# Patient Record
Sex: MALE | Race: AMERICAN INDIAN OR ALASKA NATIVE
[De-identification: names, ages, dates, MRNs, and addresses within clinical notes are randomized per-mention and may not be internally consistent; named-entity substitution may affect disease eponyms.]

---

## 2017-01-29 ENCOUNTER — HOSPITAL ENCOUNTER (EMERGENCY)
Dept: HOSPITAL 5 - ED | Age: 22
LOS: 2 days | Discharge: PSYCHIATRIC OR SUBSTANCE USE FACILITY | End: 2017-01-31
Payer: SELF-PAY

## 2017-01-29 DIAGNOSIS — F41.9: ICD-10-CM

## 2017-01-29 DIAGNOSIS — F31.9: ICD-10-CM

## 2017-01-29 DIAGNOSIS — T14.91: ICD-10-CM

## 2017-01-29 DIAGNOSIS — I10: ICD-10-CM

## 2017-01-29 DIAGNOSIS — J45.909: ICD-10-CM

## 2017-01-29 DIAGNOSIS — T50.902A: Primary | ICD-10-CM

## 2017-01-29 LAB
ALBUMIN SERPL-MCNC: 4.4 G/DL (ref 3.9–5)
ALBUMIN/GLOB SERPL: 1.4 %
ALP SERPL-CCNC: 98 UNITS/L (ref 35–129)
ALT SERPL-CCNC: 38 UNITS/L (ref 7–56)
ANION GAP SERPL CALC-SCNC: 17 MMOL/L
BASOPHILS NFR BLD AUTO: 0.3 % (ref 0–1.8)
BILIRUB SERPL-MCNC: 0.2 MG/DL (ref 0.1–1.2)
BUN SERPL-MCNC: 8 MG/DL (ref 9–20)
BUN/CREAT SERPL: 8 %
CALCIUM SERPL-MCNC: 9.1 MG/DL (ref 8.4–10.2)
CHLORIDE SERPL-SCNC: 101.3 MMOL/L (ref 98–107)
CK SERPL-CCNC: 231 UNITS/L (ref 55–170)
CO2 SERPL-SCNC: 25 MMOL/L (ref 22–30)
EOSINOPHIL NFR BLD AUTO: 1.4 % (ref 0–4.3)
GLUCOSE SERPL-MCNC: 110 MG/DL (ref 75–100)
HCT VFR BLD CALC: 40.3 % (ref 35.5–45.6)
HGB BLD-MCNC: 13.4 GM/DL (ref 11.8–15.2)
MCH RBC QN AUTO: 29 PG (ref 28–32)
MCHC RBC AUTO-ENTMCNC: 33 % (ref 32–34)
MCV RBC AUTO: 88 FL (ref 84–94)
PLATELET # BLD: 328 K/MM3 (ref 140–440)
POTASSIUM SERPL-SCNC: 3.4 MMOL/L (ref 3.6–5)
PROT SERPL-MCNC: 7.5 G/DL (ref 6.3–8.2)
RBC # BLD AUTO: 4.56 M/MM3 (ref 3.65–5.03)
SODIUM SERPL-SCNC: 140 MMOL/L (ref 137–145)
WBC # BLD AUTO: 7.3 K/MM3 (ref 4.5–11)

## 2017-01-29 PROCEDURE — 93005 ELECTROCARDIOGRAM TRACING: CPT

## 2017-01-29 PROCEDURE — 84484 ASSAY OF TROPONIN QUANT: CPT

## 2017-01-29 PROCEDURE — G0480 DRUG TEST DEF 1-7 CLASSES: HCPCS

## 2017-01-29 PROCEDURE — 82140 ASSAY OF AMMONIA: CPT

## 2017-01-29 PROCEDURE — 82550 ASSAY OF CK (CPK): CPT

## 2017-01-29 PROCEDURE — 99285 EMERGENCY DEPT VISIT HI MDM: CPT

## 2017-01-29 PROCEDURE — 93010 ELECTROCARDIOGRAM REPORT: CPT

## 2017-01-29 PROCEDURE — 36415 COLL VENOUS BLD VENIPUNCTURE: CPT

## 2017-01-29 PROCEDURE — 80053 COMPREHEN METABOLIC PANEL: CPT

## 2017-01-29 PROCEDURE — 85025 COMPLETE CBC W/AUTO DIFF WBC: CPT

## 2017-01-29 PROCEDURE — 80320 DRUG SCREEN QUANTALCOHOLS: CPT
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
## 2017-01-29 NOTE — ADMIT CRITERIA FORM
Admission Criteria Documentation: 





              DRUG INGESTION OR OVERDOSE





Clinical Indications for Admission to Inpatient Care





                                                                               (

Place 'X' for any and all applicable criteria): 





Admission is indicated for severe toxicity as indicated by ANY ONE of the 

following(1)(2)(3)(4)(5)(6):


[ X]I.    Inpatient admission required rather than observation care (Also use 

Drug Ingestion or Overdose: Observation 


         Care guideline as appropriate) because of ANY ONE of  the following:


         [ ]a)   Altered mental status that is severe or persistent


         [ ]b)   Clinical finding (eg, metabolic acidosis, hypoglycemia, 

bradycardia) that is severe or persistent


         [ ]c)   Toxic drug level that is persistent


         [ ]d)   Psychiatric risk status not acceptable for outpatient 

management


         [ ]e)   Continuous intravenous infusion of anticoagulation, platelet 

inhibitor, vasoactive, or antiarrhythmic medication (15)(16)


         [X ]f)    Other condition, treatment or monitoring requiring inpatient 

admission


[ ]II.    Respiratory abnormalities 


[ ]III.   Specific finding indicating severe and likely prolonged drug toxicity 


[ ]IV.  Hemodynamic instability


[ ]V.   Dangerous arrhythmia


[ ]VI.  Hypertension requiring inpatient treatment











Extended stay beyond goal length of stay may be needed for (4): 


[ ]a)   Neurologic or respiratory compromise 


[ ]b)   Hemodynamic instability


[ ]c)   Persistent toxic drug levels (25) 


[ ]d)   Severe drug toxicities or complications 


[ ]e)   Ongoing antidote treatment (eg, acetaminophen overdose)(5)


[ ]f)    Older patients(65 years or older)








The original [employer] content created by [employer] has been revised.


The portions of the content which have been revised are identified through the 

use of italic text or in bold, and [facility] 


has neither reviewed nor approved the modified material. All other unmodified 

content is copyright  [facility] [employer].





Please see references footnoted in the original [facility] [employer] edition 

2016


Admission Criteria Met: Yes

## 2017-01-30 NOTE — EVENT NOTE
Date: 01/30/17








I am asked by the nurse to evaluate the patient for possible convulsions/seizure

-like activity.  I have evaluated the patient in the past.  He apparently has a 

history of seizure and pseudoseizure as well as other psychiatric issues.  He 

has not taken his Lamictal since being here.  His medications have been 

reconciled by me.  Laboratory studies are reviewed and appreciated.  Lactic 

acid 2.5 nonspecific, typically clinically did not get concerned with a lactic 

acid of less than 4.0.  May be related to prolonged tourniquet time.  No fever, 

no cough, no urinary symptoms, highly doubt serious bacterial infection at this 

time.  We will reinitiate his Lamictal.  Vital signs are reviewed.  Patient is 

still awaiting psychiatric placement.








Walks with a steady gait, has a GCS of 15, NIH score of 0.


 Vital Signs











  01/29/17 01/29/17 01/29/17





  11:49 11:50 11:54


 


Temperature   


 


Pulse Rate 126 H 127 H 


 


Respiratory 17 16 18





Rate   


 


Blood Pressure   


 


Blood Pressure   





[Left]   


 


O2 Sat by Pulse 100 100 100





Oximetry   














  01/29/17 01/29/17 01/29/17





  12:00 12:15 12:30


 


Temperature   


 


Pulse Rate 144 H 120 H 108 H


 


Respiratory 22 17 15





Rate   


 


Blood Pressure 153/88 159/73 149/71


 


Blood Pressure   





[Left]   


 


O2 Sat by Pulse 99 100 99





Oximetry   














  01/29/17 01/29/17 01/29/17





  12:32 12:45 13:00


 


Temperature   


 


Pulse Rate 106 H 104 H 108 H


 


Respiratory 16 14 22





Rate   


 


Blood Pressure 149/71 150/78 168/103


 


Blood Pressure   





[Left]   


 


O2 Sat by Pulse 99 99 99





Oximetry   














  01/29/17 01/29/17 01/29/17





  13:16 13:30 13:40


 


Temperature   


 


Pulse Rate 98 H 102 H 92 H


 


Respiratory 17 19 16





Rate   


 


Blood Pressure 128/68 120/48 120/48


 


Blood Pressure   





[Left]   


 


O2 Sat by Pulse 99 100 97





Oximetry   














  01/29/17 01/29/17 01/29/17





  13:46 14:00 14:16


 


Temperature   


 


Pulse Rate 92 H 90 88


 


Respiratory 16 14 13





Rate   


 


Blood Pressure 120/43 111/42 134/42


 


Blood Pressure   





[Left]   


 


O2 Sat by Pulse 97 98 98





Oximetry   














  01/29/17 01/29/17 01/29/17





  14:30 14:36 14:46


 


Temperature   


 


Pulse Rate 99 H 92 H 97 H


 


Respiratory 21 17 19





Rate   


 


Blood Pressure 156/71 156/71 120/62


 


Blood Pressure   





[Left]   


 


O2 Sat by Pulse 98 99 100





Oximetry   














  01/29/17 01/29/17 01/29/17





  15:00 15:15 15:30


 


Temperature   


 


Pulse Rate 90 86 96 H


 


Respiratory 14 13 17





Rate   


 


Blood Pressure 102/51 91/45 152/83


 


Blood Pressure   





[Left]   


 


O2 Sat by Pulse 97 96 99





Oximetry   














  01/29/17 01/29/17 01/29/17





  15:45 16:00 16:16


 


Temperature   


 


Pulse Rate 94 H 88 99 H


 


Respiratory 18 14 18





Rate   


 


Blood Pressure 140/77 132/70 153/87


 


Blood Pressure   





[Left]   


 


O2 Sat by Pulse 99 99 98





Oximetry   














  01/29/17 01/29/17 01/29/17





  16:30 16:45 17:00


 


Temperature   


 


Pulse Rate 92 H 90 84


 


Respiratory 16 14 14





Rate   


 


Blood Pressure 148/77 150/60 133/58


 


Blood Pressure   





[Left]   


 


O2 Sat by Pulse 98 97 98





Oximetry   














  01/29/17 01/29/17 01/29/17





  17:15 17:30 17:45


 


Temperature   


 


Pulse Rate 95 H 104 H 102 H


 


Respiratory 14 16 21





Rate   


 


Blood Pressure 134/66 142/70 144/118


 


Blood Pressure   





[Left]   


 


O2 Sat by Pulse 99 99 99





Oximetry   














  01/29/17 01/29/17 01/29/17





  18:00 18:16 19:00


 


Temperature   97.0 F L


 


Pulse Rate 115 H 115 H 94 H


 


Respiratory 22 15 20





Rate   


 


Blood Pressure 144/118 175/106 


 


Blood Pressure   145/82





[Left]   


 


O2 Sat by Pulse 98 97 97





Oximetry   














  01/30/17 01/30/17





  08:52 12:39


 


Temperature 98.2 F 


 


Pulse Rate 97 H 101 H


 


Respiratory 16 14





Rate  


 


Blood Pressure  


 


Blood Pressure 138/88 133/81





[Left]  


 


O2 Sat by Pulse 97 99





Oximetry  








 Lab Results











  01/29/17 01/29/17 01/29/17 Range/Units





  11:41 11:41 11:42 


 


WBC  7.3    (4.5-11.0)  K/mm3


 


RBC  4.56    (3.65-5.03)  M/mm3


 


Hgb  13.4    (11.8-15.2)  gm/dl


 


Hct  40.3    (35.5-45.6)  %


 


MCV  88    (84-94)  fl


 


MCH  29    (28-32)  pg


 


MCHC  33    (32-34)  %


 


RDW  13.6    (13.2-15.2)  %


 


Plt Count  328    (140-440)  K/mm3


 


Lymph % (Auto)  17.7    (13.4-35.0)  %


 


Mono % (Auto)  7.0    (0.0-7.3)  %


 


Eos % (Auto)  1.4    (0.0-4.3)  %


 


Baso % (Auto)  0.3    (0.0-1.8)  %


 


Lymph #  1.3    (1.2-5.4)  K/mm3


 


Mono #  0.5    (0.0-0.8)  K/mm3


 


Eos #  0.1    (0.0-0.4)  K/mm3


 


Baso #  0.0    (0.0-0.1)  K/mm3


 


Seg Neutrophils %  73.6 H    (40.0-70.0)  %


 


Seg Neutrophils #  5.4    (1.8-7.7)  K/mm3


 


Sodium   140   (137-145)  mmol/L


 


Potassium   3.4 L   (3.6-5.0)  mmol/L


 


Chloride   101.3   ()  mmol/L


 


Carbon Dioxide   25   (22-30)  mmol/L


 


Anion Gap   17   mmol/L


 


BUN   8 L   (9-20)  mg/dL


 


Creatinine   1.0   (0.8-1.5)  mg/dL


 


Estimated GFR   > 60   ml/min


 


BUN/Creatinine Ratio   8.00   %


 


Glucose   110 H   ()  mg/dL


 


Lactic Acid     (0.7-2.0)  mmol/L


 


Calcium   9.1   (8.4-10.2)  mg/dL


 


Total Bilirubin   0.2   (0.1-1.2)  mg/dL


 


AST   20   (5-40)  units/L


 


ALT   38   (7-56)  units/L


 


Alkaline Phosphatase   98   ()  units/L


 


Total Creatine Kinase    231 H  ()  units/L


 


Troponin T    < 0.010  (0.00-0.029)  ng/mL


 


Total Protein   7.5   (6.3-8.2)  g/dL


 


Albumin   4.4   (3.9-5)  g/dL


 


Albumin/Globulin Ratio   1.4   %


 


Salicylates     (2.8-20.0)  mg/dL


 


Acetaminophen     (10.0-30.0)  ug/mL


 


Plasma/Serum Alcohol     (0-0.07)  gm%














  01/29/17 01/29/17 01/29/17 Range/Units





  11:42 11:42 11:42 


 


WBC     (4.5-11.0)  K/mm3


 


RBC     (3.65-5.03)  M/mm3


 


Hgb     (11.8-15.2)  gm/dl


 


Hct     (35.5-45.6)  %


 


MCV     (84-94)  fl


 


MCH     (28-32)  pg


 


MCHC     (32-34)  %


 


RDW     (13.2-15.2)  %


 


Plt Count     (140-440)  K/mm3


 


Lymph % (Auto)     (13.4-35.0)  %


 


Mono % (Auto)     (0.0-7.3)  %


 


Eos % (Auto)     (0.0-4.3)  %


 


Baso % (Auto)     (0.0-1.8)  %


 


Lymph #     (1.2-5.4)  K/mm3


 


Mono #     (0.0-0.8)  K/mm3


 


Eos #     (0.0-0.4)  K/mm3


 


Baso #     (0.0-0.1)  K/mm3


 


Seg Neutrophils %     (40.0-70.0)  %


 


Seg Neutrophils #     (1.8-7.7)  K/mm3


 


Sodium     (137-145)  mmol/L


 


Potassium     (3.6-5.0)  mmol/L


 


Chloride     ()  mmol/L


 


Carbon Dioxide     (22-30)  mmol/L


 


Anion Gap     mmol/L


 


BUN     (9-20)  mg/dL


 


Creatinine     (0.8-1.5)  mg/dL


 


Estimated GFR     ml/min


 


BUN/Creatinine Ratio     %


 


Glucose     ()  mg/dL


 


Lactic Acid     (0.7-2.0)  mmol/L


 


Calcium     (8.4-10.2)  mg/dL


 


Total Bilirubin     (0.1-1.2)  mg/dL


 


AST     (5-40)  units/L


 


ALT     (7-56)  units/L


 


Alkaline Phosphatase     ()  units/L


 


Total Creatine Kinase     ()  units/L


 


Troponin T     (0.00-0.029)  ng/mL


 


Total Protein     (6.3-8.2)  g/dL


 


Albumin     (3.9-5)  g/dL


 


Albumin/Globulin Ratio     %


 


Salicylates  < 0.3 L    (2.8-20.0)  mg/dL


 


Acetaminophen   < 15.0   (10.0-30.0)  ug/mL


 


Plasma/Serum Alcohol    < 0.01  (0-0.07)  gm%














  01/29/17 Range/Units





  11:50 


 


WBC   (4.5-11.0)  K/mm3


 


RBC   (3.65-5.03)  M/mm3


 


Hgb   (11.8-15.2)  gm/dl


 


Hct   (35.5-45.6)  %


 


MCV   (84-94)  fl


 


MCH   (28-32)  pg


 


MCHC   (32-34)  %


 


RDW   (13.2-15.2)  %


 


Plt Count   (140-440)  K/mm3


 


Lymph % (Auto)   (13.4-35.0)  %


 


Mono % (Auto)   (0.0-7.3)  %


 


Eos % (Auto)   (0.0-4.3)  %


 


Baso % (Auto)   (0.0-1.8)  %


 


Lymph #   (1.2-5.4)  K/mm3


 


Mono #   (0.0-0.8)  K/mm3


 


Eos #   (0.0-0.4)  K/mm3


 


Baso #   (0.0-0.1)  K/mm3


 


Seg Neutrophils %   (40.0-70.0)  %


 


Seg Neutrophils #   (1.8-7.7)  K/mm3


 


Sodium   (137-145)  mmol/L


 


Potassium   (3.6-5.0)  mmol/L


 


Chloride   ()  mmol/L


 


Carbon Dioxide   (22-30)  mmol/L


 


Anion Gap   mmol/L


 


BUN   (9-20)  mg/dL


 


Creatinine   (0.8-1.5)  mg/dL


 


Estimated GFR   ml/min


 


BUN/Creatinine Ratio   %


 


Glucose   ()  mg/dL


 


Lactic Acid  2.5 H*  (0.7-2.0)  mmol/L


 


Calcium   (8.4-10.2)  mg/dL


 


Total Bilirubin   (0.1-1.2)  mg/dL


 


AST   (5-40)  units/L


 


ALT   (7-56)  units/L


 


Alkaline Phosphatase   ()  units/L


 


Total Creatine Kinase   ()  units/L


 


Troponin T   (0.00-0.029)  ng/mL


 


Total Protein   (6.3-8.2)  g/dL


 


Albumin   (3.9-5)  g/dL


 


Albumin/Globulin Ratio   %


 


Salicylates   (2.8-20.0)  mg/dL


 


Acetaminophen   (10.0-30.0)  ug/mL


 


Plasma/Serum Alcohol   (0-0.07)  gm%

## 2017-01-31 VITALS — DIASTOLIC BLOOD PRESSURE: 86 MMHG | SYSTOLIC BLOOD PRESSURE: 138 MMHG

## 2017-12-01 ENCOUNTER — HOSPITAL ENCOUNTER (EMERGENCY)
Dept: HOSPITAL 5 - ED | Age: 22
LOS: 1 days | Discharge: HOME | End: 2017-12-02
Payer: COMMERCIAL

## 2017-12-01 DIAGNOSIS — M79.652: ICD-10-CM

## 2017-12-01 DIAGNOSIS — M54.9: Primary | ICD-10-CM
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
## 2017-12-01 DIAGNOSIS — M79.651: ICD-10-CM
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
## 2017-12-01 PROCEDURE — 99284 EMERGENCY DEPT VISIT MOD MDM: CPT

## 2017-12-01 PROCEDURE — 85025 COMPLETE CBC W/AUTO DIFF WBC: CPT

## 2017-12-01 PROCEDURE — 80053 COMPREHEN METABOLIC PANEL: CPT

## 2017-12-01 PROCEDURE — 74177 CT ABD & PELVIS W/CONTRAST: CPT

## 2017-12-01 PROCEDURE — 72128 CT CHEST SPINE W/O DYE: CPT

## 2017-12-01 PROCEDURE — 70450 CT HEAD/BRAIN W/O DYE: CPT

## 2017-12-01 PROCEDURE — 96372 THER/PROPH/DIAG INJ SC/IM: CPT

## 2017-12-01 PROCEDURE — 36415 COLL VENOUS BLD VENIPUNCTURE: CPT

## 2017-12-02 VITALS — SYSTOLIC BLOOD PRESSURE: 124 MMHG | DIASTOLIC BLOOD PRESSURE: 79 MMHG

## 2017-12-02 LAB
ALBUMIN SERPL-MCNC: 4 G/DL (ref 3.9–5)
ALBUMIN/GLOB SERPL: 1.5 %
ALP SERPL-CCNC: 76 UNITS/L (ref 35–129)
ALT SERPL-CCNC: 42 UNITS/L (ref 7–56)
ANION GAP SERPL CALC-SCNC: 18 MMOL/L
BASOPHILS NFR BLD AUTO: 0.1 % (ref 0–1.8)
BILIRUB SERPL-MCNC: 0.2 MG/DL (ref 0.1–1.2)
BUN SERPL-MCNC: 5 MG/DL (ref 9–20)
BUN/CREAT SERPL: 8 %
CALCIUM SERPL-MCNC: 9 MG/DL (ref 8.4–10.2)
CHLORIDE SERPL-SCNC: 100.2 MMOL/L (ref 98–107)
CO2 SERPL-SCNC: 24 MMOL/L (ref 22–30)
EOSINOPHIL NFR BLD AUTO: 1.3 % (ref 0–4.3)
GLUCOSE SERPL-MCNC: 91 MG/DL (ref 75–100)
HCT VFR BLD CALC: 39.9 % (ref 35.5–45.6)
HGB BLD-MCNC: 13.7 GM/DL (ref 11.8–15.2)
MCH RBC QN AUTO: 30 PG (ref 28–32)
MCHC RBC AUTO-ENTMCNC: 34 % (ref 32–34)
MCV RBC AUTO: 87 FL (ref 84–94)
PLATELET # BLD: 372 K/MM3 (ref 140–440)
POTASSIUM SERPL-SCNC: 3.7 MMOL/L (ref 3.6–5)
PROT SERPL-MCNC: 6.7 G/DL (ref 6.3–8.2)
RBC # BLD AUTO: 4.61 M/MM3 (ref 3.65–5.03)
SODIUM SERPL-SCNC: 138 MMOL/L (ref 137–145)
WBC # BLD AUTO: 10.8 K/MM3 (ref 4.5–11)
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
## 2017-12-02 NOTE — CAT SCAN REPORT
FINAL REPORT



PROCEDURE:  CT THORACIC SPINE WO CON



TECHNIQUE:  Computerized axial tomography of the thoracic spine

was performed from C7 - L1 without contrast material. 



HISTORY:  traUMA 



COMPARISON:  No prior studies are available for comparison.



FINDINGS:  

There are no fractures or malalignments. Intervertebral disc

spaces are normal. Facet joints are intact. There is no bony

spinal or foraminal stenosis. Soft tissues are unremarkable.



IMPRESSION:  

There are no fractures or malalignments.

## 2017-12-02 NOTE — CAT SCAN REPORT
FINAL REPORT



PROCEDURE:  CT ABDOMEN PELVIS W CON



TECHNIQUE:  Computerized axial tomography of the abdomen and

pelvis was performed after the IV injection of iodinated nonionic

contrast. 



HISTORY:  traUMA 



COMPARISON:  No prior studies are available for comparison.



FINDINGS:  

Visualized lower thorax: No significant abnormality.



Liver: Normal size and attenuation. There is no liver laceration.





Spleen: Normal size and attenuation. There is no spleen

laceration. 



Gallbladder and biliary system: Normal.



Pancreas: Normal.



Adrenals: Normal.



Kidneys: Normal.



GI tract: There has been an appendectomy. There is no acute bowel

abnormality..



Lymph nodes and mesentery: Normal. 



Vasculature: Normal.



Bladder: Normal.



Reproductive organs: Normal.



Peritoneum: There is no hemoperitoneum, ascites, free air,

abscess or adenopathy..



Musculoskeletal structures: No significant abnormality.



Other: None.  



IMPRESSION:  

There is no acute traumatic injury of the abdomen or pelvis.

## 2019-11-29 ENCOUNTER — HOSPITAL ENCOUNTER (EMERGENCY)
Dept: HOSPITAL 5 - ED | Age: 24
Discharge: HOME | End: 2019-11-29
Payer: COMMERCIAL

## 2019-11-29 VITALS — SYSTOLIC BLOOD PRESSURE: 137 MMHG | DIASTOLIC BLOOD PRESSURE: 87 MMHG

## 2019-11-29 DIAGNOSIS — Z79.899: ICD-10-CM

## 2019-11-29 DIAGNOSIS — I10: ICD-10-CM

## 2019-11-29 DIAGNOSIS — F32.9: ICD-10-CM

## 2019-11-29 DIAGNOSIS — Z88.8: ICD-10-CM

## 2019-11-29 DIAGNOSIS — J45.909: ICD-10-CM

## 2019-11-29 DIAGNOSIS — R56.9: Primary | ICD-10-CM

## 2019-11-29 LAB
BUN SERPL-MCNC: 8 MG/DL (ref 9–20)
BUN/CREAT SERPL: 9 %
CALCIUM SERPL-MCNC: 9.4 MG/DL (ref 8.4–10.2)
HCT VFR BLD CALC: 42 % (ref 35.5–45.6)
HEMOLYSIS INDEX: 47
HGB BLD-MCNC: 14 GM/DL (ref 11.8–15.2)
MCHC RBC AUTO-ENTMCNC: 34 % (ref 32–34)
MCV RBC AUTO: 91 FL (ref 84–94)
PLATELET # BLD: 313 K/MM3 (ref 140–440)
RBC # BLD AUTO: 4.62 M/MM3 (ref 3.65–5.03)

## 2019-11-29 PROCEDURE — 36415 COLL VENOUS BLD VENIPUNCTURE: CPT

## 2019-11-29 PROCEDURE — 80048 BASIC METABOLIC PNL TOTAL CA: CPT

## 2019-11-29 PROCEDURE — 99284 EMERGENCY DEPT VISIT MOD MDM: CPT

## 2019-11-29 PROCEDURE — 85027 COMPLETE CBC AUTOMATED: CPT

## 2019-11-29 PROCEDURE — 82962 GLUCOSE BLOOD TEST: CPT

## 2019-11-29 PROCEDURE — 96365 THER/PROPH/DIAG IV INF INIT: CPT

## 2019-11-29 NOTE — EMERGENCY DEPARTMENT REPORT
ED Seizure HPI





- General


Chief Complaint: Seizure


Stated Complaint: CONVULSIONS


Time Seen by Provider: 11/29/19 15:22


Source: patient, EMS


Mode of arrival: Ambulatory


Limitations: No Limitations





- History of Present Illness


Initial Comments: 





Patient is a 24-year-old Afro-American male with a past medical history of 

seizure disorder who states that he had a seizure today as well as 2 weeks ago. 

Patient states that he is compliant with his Lamictal is not missed any doses.  

He denies fevers chills neck stiffness, cold congestion or nausea vomiting.





- Related Data


                                Home Medications











 Medication  Instructions  Recorded  Confirmed  Last Taken


 


Prazosin [Minipress] 2 mg PO QHS 06/17/16 01/30/17 Unknown


 


QUEtiapine [SEROquel] 125 mg PO QPM 06/17/16 01/29/17 Unknown


 


Venlafaxine HCl [Effexor Xr] 150 mg PO QAM 01/30/17 01/30/17 Unknown


 


lamoTRIgine [LaMICtal] 200 mg PO QAM 01/30/17 01/30/17 Unknown


 


lamoTRIgine [LaMICtal] 250 mg PO QHS 01/30/17 01/30/17 Unknown








                                  Previous Rx's











 Medication  Instructions  Recorded  Last Taken  Type


 


levETIRAcetam [Keppra TAB] 500 mg PO BID #60 tablet 11/29/19 Unknown Rx











                                    Allergies











Allergy/AdvReac Type Severity Reaction Status Date / Time


 


naproxen Allergy  Hives Verified 01/29/17 19:49














ED Review of Systems


ROS: 


Stated complaint: CONVULSIONS


Other details as noted in HPI





Comment: All other systems reviewed and negative





ED Past Medical Hx





- Past Medical History


Previous Medical History?: Yes


Hx Hypertension: Yes


Hx Seizures: Yes


Hx Psychiatric Treatment: Yes (depression panic disorder/ANXIETY/ BIPOLAR)


Hx Asthma: Yes


Hx HIV: No





- Surgical History


Past Surgical History?: No





- Social History


Smoking Status: Never Smoker


Substance Use Type: Alcohol





- Medications


Home Medications: 


                                Home Medications











 Medication  Instructions  Recorded  Confirmed  Last Taken  Type


 


Prazosin [Minipress] 2 mg PO QHS 06/17/16 01/30/17 Unknown History


 


QUEtiapine [SEROquel] 125 mg PO QPM 06/17/16 01/29/17 Unknown History


 


Venlafaxine HCl [Effexor Xr] 150 mg PO QAM 01/30/17 01/30/17 Unknown History


 


lamoTRIgine [LaMICtal] 200 mg PO QAM 01/30/17 01/30/17 Unknown History


 


lamoTRIgine [LaMICtal] 250 mg PO QHS 01/30/17 01/30/17 Unknown History


 


levETIRAcetam [Keppra TAB] 500 mg PO BID #60 tablet 11/29/19  Unknown Rx














ED Physical Exam





- General


Limitations: No Limitations


General appearance: alert, in no apparent distress





- Head


Head exam: Present: atraumatic, normocephalic





- Eye


Eye exam: Present: normal appearance, PERRL, EOMI





- ENT


ENT exam: Present: mucous membranes moist





- Neck


Neck exam: Present: normal inspection





- Respiratory


Respiratory exam: Present: normal lung sounds bilaterally.  Absent: respiratory 

distress, wheezes, rales, rhonchi





- Cardiovascular


Cardiovascular Exam: Present: regular rate, normal rhythm.  Absent: systolic 

murmur, diastolic murmur, rubs, gallop





- GI/Abdominal


GI/Abdominal exam: Present: soft, normal bowel sounds





- Rectal


Rectal exam: Present: deferred





- Extremities Exam


Extremities exam: Present: normal inspection





- Back Exam


Back exam: Present: normal inspection





- Neurological Exam


Neurological exam: Present: alert, oriented X3





- Psychiatric


Psychiatric exam: Present: normal affect, normal mood





- Skin


Skin exam: Present: warm, dry, intact, normal color.  Absent: rash





ED Course





                                   Vital Signs











  11/29/19





  15:12


 


Temperature 99.0 F


 


Pulse Rate 95 H


 


Respiratory 16





Rate 


 


Blood Pressure 137/84


 


O2 Sat by Pulse 98





Oximetry 














ED Medical Decision Making





- Lab Data


Result diagrams: 


                                 11/29/19 15:35





                                 11/29/19 15:35





- Medical Decision Making





Patient states he is compliant with his Lamictal and is having breakthrough 

seizures.  Patient was loaded with Keppra will have this medication added as a 

outpatient medication.  Patient has [employer] insurance and is to follow-up with 

their neurologist.


Critical care attestation.: 


If time is entered above; I have spent that time in minutes in the direct care 

of this critically ill patient, excluding procedure time.








ED Disposition


Clinical Impression: 


 Seizure





Disposition: DC-01 TO HOME OR SELFCARE


Is pt being admited?: No


Does the pt Need Aspirin: No


Condition: Stable


Instructions:  Epilepsy (ED)


Prescriptions: 


levETIRAcetam [Keppra TAB] 500 mg PO BID #60 tablet


Referrals: 


[facility] PISANO [Other] - 3-5 Days (Please follow up with [facility] so that they can

 scheduled to see a neurologist)


Time of Disposition: 17:18

## 2020-10-19 ENCOUNTER — HOSPITAL ENCOUNTER (EMERGENCY)
Dept: HOSPITAL 5 - ED | Age: 25
LOS: 1 days | Discharge: PSYCHIATRIC OR SUBSTANCE USE FACILITY | End: 2020-10-20
Payer: COMMERCIAL

## 2020-10-19 DIAGNOSIS — F41.9: ICD-10-CM

## 2020-10-19 DIAGNOSIS — G40.909: Primary | ICD-10-CM

## 2020-10-19 DIAGNOSIS — I10: ICD-10-CM

## 2020-10-19 DIAGNOSIS — F32.9: ICD-10-CM

## 2020-10-19 DIAGNOSIS — Z79.899: ICD-10-CM

## 2020-10-19 DIAGNOSIS — R45.851: ICD-10-CM

## 2020-10-19 DIAGNOSIS — J45.909: ICD-10-CM

## 2020-10-19 DIAGNOSIS — Z88.8: ICD-10-CM

## 2020-10-19 LAB
ALBUMIN SERPL-MCNC: 4.6 G/DL (ref 3.9–5)
ALT SERPL-CCNC: 30 UNITS/L (ref 7–56)
BASOPHILS # (AUTO): 0 K/MM3 (ref 0–0.1)
BASOPHILS NFR BLD AUTO: 0.4 % (ref 0–1.8)
BUN SERPL-MCNC: 12 MG/DL (ref 9–20)
BUN/CREAT SERPL: 12 %
CALCIUM SERPL-MCNC: 9.5 MG/DL (ref 8.4–10.2)
EOSINOPHIL # BLD AUTO: 0.1 K/MM3 (ref 0–0.4)
EOSINOPHIL NFR BLD AUTO: 0.7 % (ref 0–4.3)
HCT VFR BLD CALC: 43.4 % (ref 35.5–45.6)
HEMOLYSIS INDEX: 12
HGB BLD-MCNC: 14.5 GM/DL (ref 11.8–15.2)
LYMPHOCYTES # BLD AUTO: 2.1 K/MM3 (ref 1.2–5.4)
LYMPHOCYTES NFR BLD AUTO: 18.4 % (ref 13.4–35)
MCHC RBC AUTO-ENTMCNC: 34 % (ref 32–34)
MCV RBC AUTO: 92 FL (ref 84–94)
MONOCYTES # (AUTO): 0.7 K/MM3 (ref 0–0.8)
MONOCYTES % (AUTO): 6.4 % (ref 0–7.3)
PLATELET # BLD: 366 K/MM3 (ref 140–440)
RBC # BLD AUTO: 4.72 M/MM3 (ref 3.65–5.03)

## 2020-10-19 PROCEDURE — 81001 URINALYSIS AUTO W/SCOPE: CPT

## 2020-10-19 PROCEDURE — 80307 DRUG TEST PRSMV CHEM ANLYZR: CPT

## 2020-10-19 PROCEDURE — 85025 COMPLETE CBC W/AUTO DIFF WBC: CPT

## 2020-10-19 PROCEDURE — 80053 COMPREHEN METABOLIC PANEL: CPT

## 2020-10-19 PROCEDURE — 36415 COLL VENOUS BLD VENIPUNCTURE: CPT

## 2020-10-19 PROCEDURE — 80320 DRUG SCREEN QUANTALCOHOLS: CPT

## 2020-10-19 PROCEDURE — G0480 DRUG TEST DEF 1-7 CLASSES: HCPCS

## 2020-10-19 PROCEDURE — 93005 ELECTROCARDIOGRAM TRACING: CPT

## 2020-10-19 PROCEDURE — 83735 ASSAY OF MAGNESIUM: CPT

## 2020-10-19 NOTE — EMERGENCY DEPARTMENT REPORT
HPI





- General


Chief Complaint: Seizure


Time Seen by Provider: 10/19/20 16:59





- HPI


HPI: 





This is a 25-year-old male presents to the emergency department, brought in by 

his mother, for a medical clearance for psychiatric admission, as well as a 

seizure.  The patient admits to daily alcohol use but really was abusing alcohol

over the weekend.  The patient does have a seizure disorder and has been 

noncompliant with his Lamictal 100 mg 3 times daily, over the past 3 to 4 days. 

Patient has a history of depression, anxiety, bipolar disorder and says that he 

has been having suicidal ideations over the past 1 to 2 weeks.  He decided to go

get psychiatric help and was brought over to [place], but the patient 

allegedly had a seizure in the lobby.  For this reason he was brought to our 

emergency department.  At the time of my examination the patient is awake, 

alert, oriented and has the complaints of nausea without vomiting and some body 

aches.  He has a past medical history that includes asthma and hypertension.  He

denies any fever, vision change, slurred speech, numbness or paresthesias.  The 

patient does not have any specific plan as to how he would harm himself.  He 

denies any homicidal ideations or any hallucinations.





ED Past Medical Hx





- Past Medical History


Previous Medical History?: Yes


Hx Hypertension: Yes


Hx Seizures: Yes


Hx Psychiatric Treatment: Yes (depression panic disorder/ANXIETY/ BIPOLAR)


Hx Asthma: Yes


Hx HIV: No





- Social History


Smoking Status: Never Smoker


Substance Use Type: Alcohol





- Medications


Home Medications: 


                                Home Medications











 Medication  Instructions  Recorded  Confirmed  Last Taken  Type


 


Prazosin [Minipress] 2 mg PO QHS 06/17/16 01/30/17 Unknown History


 


QUEtiapine [SEROquel] 125 mg PO QPM 06/17/16 01/29/17 Unknown History


 


Venlafaxine HCl [Effexor Xr] 150 mg PO QAM 01/30/17 01/30/17 Unknown History


 


lamoTRIgine [LaMICtal] 200 mg PO QAM 01/30/17 01/30/17 Unknown History


 


lamoTRIgine [LaMICtal] 250 mg PO QHS 01/30/17 01/30/17 Unknown History


 


levETIRAcetam [Keppra TAB] 500 mg PO BID #60 tablet 11/29/19  Unknown Rx














ED Review of Systems


ROS: 


Stated complaint: SEIZURE


Other details as noted in HPI





Comment: All other systems reviewed and negative


Constitutional: denies: chills, fever


Eyes: denies: eye pain, vision change


ENT: denies: ear pain, throat pain


Respiratory: denies: cough, shortness of breath


Cardiovascular: denies: chest pain, palpitations


Gastrointestinal: nausea.  denies: vomiting


Genitourinary: denies: dysuria, discharge


Musculoskeletal: myalgia.  denies: joint swelling


Skin: denies: rash, lesions


Neurological: denies: weakness, numbness





Physical Exam





- Physical Exam


Vital Signs: 


                                   Vital Signs











  10/19/20





  17:05


 


Temperature 98 F


 


Pulse Rate 95 H


 


Respiratory 18





Rate 


 


Blood Pressure 149/93





[Right] 


 


O2 Sat by Pulse 100





Oximetry 











Physical Exam: 





GENERAL: The patient is well-developed well-nourished.


HENT: Normocephalic.  Atraumatic.    Patient has moist mucous membranes.


EYES: Extraocular motions are intact.  Pupils equal reactive to light 

bilaterally.


NECK: Supple. Trachea is midline.


CHEST/LUNGS: Clear to auscultation.  There is no respiratory distress noted.


HEART/CARDIOVASCULAR: Regular.  There is no tachycardia.  There is no murmur.


ABDOMEN: Abdomen is soft, nontender.  Patient has normal bowel sounds. 


SKIN: Skin is warm and dry. 


NEURO: The patient is awake, alert, and oriented.  The patient is cooperative.  

The patient has no focal neurologic deficits.  Normal speech.  Cranial nerves II

 through XII grossly intact.


MUSCULOSKELETAL: There is no tenderness or deformity.  There is no limitation 

range of motion.  





ED Course


                                   Vital Signs











  10/19/20





  17:05


 


Temperature 98 F


 


Pulse Rate 95 H


 


Respiratory 18





Rate 


 


Blood Pressure 149/93





[Right] 


 


O2 Sat by Pulse 100





Oximetry 














ED Medical Decision Making





- Lab Data


Result diagrams: 


                                 10/19/20 17:09





                                 10/19/20 17:09





- EKG Data


-: EKG Interpreted by Me


EKG shows normal: sinus rhythm, axis, intervals, QRS complexes, ST-T waves


Rate: normal





- EKG Data


When compared to previous EKG there are: previous EKG unavailable


Interpretation: normal EKG





- Medical Decision Making





This patient has been dealing with suicidal ideations over the past 1 to 2 

weeks.  He also admits to some recent alcohol abuse.  The patient went over to 

[facility] with the hopes of being admitted to their facility 

for his depression and suicidal ideation.  However while he was there he 

allegedly had a seizure.  He was then brought to the emergency department by his

 mother for further evaluation.  Since being in the emergency department the 

patient has been awake, alert, oriented and there has been no further seizure-

like activity.  On examination he does not have any focal, motor or sensory 

deficits and his cranial nerves are intact.  He was given a dose of his 

Lamictal.





His vital signs have been reassuring throughout his ED course.  Labs have been 

mostly unremarkable including CBC, metabolic panel, blood alcohol level, urine 

drug screen.  Patient has been made a 1013 secondary to his suicidal ideations 

and will be seen by the psychiatric team in the morning for further disposition.

  If the patient requires inpatient stabilization, he is medically cleared to do

 so.


Critical Care Time: No


Critical care attestation.: 


If time is entered above; I have spent that time in minutes in the direct care 

of this critically ill patient, excluding procedure time.








ED Disposition


Clinical Impression: 


 Seizure disorder, Suicidal ideations





Depression


Qualifiers:


 Depression Type: unspecified Qualified Code(s): F32.9 - Major depressive 

disorder, single episode, unspecified





Disposition: DC/TX-65 PSY HOSP/PSY UNIT


Is pt being admited?: No


Condition: Stable


Time of Disposition: 03:57

## 2020-10-19 NOTE — EVENT NOTE
ED Screening Note


Date of service: 10/19/20


Time: 17:03


ED Screening Note: 





Pt here for medical clearance for alcohol detox and seizure 


went to [place] and was referred to ED


+SI


seizure 15 minutes PTA-currently taking dilantin 





This initial assessment/diagnostic orders/clinical plan/treatment(s) is/are 

subject to change based on patients health status, clinical progression and re-

assessment by fellow clinical providers in the ED. Further treatment and workup 

at subsequent clinical providers discretion. Patient/guardian urged not to elope

from the ED as their condition may be serious if not clinically assessed and 

managed. 





Initial orders include: 


labs


ekg

## 2020-10-20 VITALS — DIASTOLIC BLOOD PRESSURE: 79 MMHG | SYSTOLIC BLOOD PRESSURE: 115 MMHG

## 2020-10-20 LAB
BENZODIAZEPINES SCREEN,URINE: (no result)
BILIRUB UR QL STRIP: (no result)
BLOOD UR QL VISUAL: (no result)
METHADONE SCREEN,URINE: (no result)
MUCOUS THREADS #/AREA URNS HPF: (no result) /HPF
OPIATE SCREEN,URINE: (no result)
PH UR STRIP: 7 [PH] (ref 5–7)
RBC #/AREA URNS HPF: 1 /HPF (ref 0–6)
UROBILINOGEN UR-MCNC: 2 MG/DL (ref ?–2)
WBC #/AREA URNS HPF: 1 /HPF (ref 0–6)

## 2021-02-14 ENCOUNTER — HOSPITAL ENCOUNTER (EMERGENCY)
Dept: HOSPITAL 5 - ED | Age: 26
Discharge: HOME | End: 2021-02-14
Payer: COMMERCIAL

## 2021-02-14 VITALS — DIASTOLIC BLOOD PRESSURE: 94 MMHG | SYSTOLIC BLOOD PRESSURE: 160 MMHG

## 2021-02-14 DIAGNOSIS — Z88.8: ICD-10-CM

## 2021-02-14 DIAGNOSIS — Z79.899: ICD-10-CM

## 2021-02-14 DIAGNOSIS — I10: ICD-10-CM

## 2021-02-14 DIAGNOSIS — F29: ICD-10-CM

## 2021-02-14 DIAGNOSIS — J45.909: ICD-10-CM

## 2021-02-14 DIAGNOSIS — G40.909: Primary | ICD-10-CM

## 2021-02-14 LAB
ALBUMIN SERPL-MCNC: 4.6 G/DL (ref 3.9–5)
ALT SERPL-CCNC: 46 UNITS/L (ref 7–56)
APTT BLD: 26.1 SEC. (ref 24.2–36.6)
BASOPHILS # (AUTO): 0.1 K/MM3 (ref 0–0.1)
BASOPHILS NFR BLD AUTO: 0.8 % (ref 0–1.8)
BILIRUB DIRECT SERPL-MCNC: < 0.2 MG/DL (ref 0–0.2)
BUN SERPL-MCNC: 9 MG/DL (ref 9–20)
BUN/CREAT SERPL: 9 %
CALCIUM SERPL-MCNC: 9.2 MG/DL (ref 8.4–10.2)
EOSINOPHIL # BLD AUTO: 0.1 K/MM3 (ref 0–0.4)
EOSINOPHIL NFR BLD AUTO: 0.8 % (ref 0–4.3)
HCT VFR BLD CALC: 41.3 % (ref 35.5–45.6)
HEMOLYSIS INDEX: 11
HGB BLD-MCNC: 14 GM/DL (ref 11.8–15.2)
INR PPP: 0.97 (ref 0.87–1.13)
LYMPHOCYTES # BLD AUTO: 1.5 K/MM3 (ref 1.2–5.4)
LYMPHOCYTES NFR BLD AUTO: 17.4 % (ref 13.4–35)
MCHC RBC AUTO-ENTMCNC: 34 % (ref 32–34)
MCV RBC AUTO: 92 FL (ref 84–94)
MONOCYTES # (AUTO): 0.6 K/MM3 (ref 0–0.8)
MONOCYTES % (AUTO): 6.4 % (ref 0–7.3)
PLATELET # BLD: 325 K/MM3 (ref 140–440)
RBC # BLD AUTO: 4.49 M/MM3 (ref 3.65–5.03)

## 2021-02-14 PROCEDURE — 82553 CREATINE MB FRACTION: CPT

## 2021-02-14 PROCEDURE — 93005 ELECTROCARDIOGRAM TRACING: CPT

## 2021-02-14 PROCEDURE — 82550 ASSAY OF CK (CPK): CPT

## 2021-02-14 PROCEDURE — G0480 DRUG TEST DEF 1-7 CLASSES: HCPCS

## 2021-02-14 PROCEDURE — 71045 X-RAY EXAM CHEST 1 VIEW: CPT

## 2021-02-14 PROCEDURE — 83880 ASSAY OF NATRIURETIC PEPTIDE: CPT

## 2021-02-14 PROCEDURE — 85025 COMPLETE CBC W/AUTO DIFF WBC: CPT

## 2021-02-14 PROCEDURE — 36415 COLL VENOUS BLD VENIPUNCTURE: CPT

## 2021-02-14 PROCEDURE — 82962 GLUCOSE BLOOD TEST: CPT

## 2021-02-14 PROCEDURE — 83735 ASSAY OF MAGNESIUM: CPT

## 2021-02-14 PROCEDURE — 80076 HEPATIC FUNCTION PANEL: CPT

## 2021-02-14 PROCEDURE — 80048 BASIC METABOLIC PNL TOTAL CA: CPT

## 2021-02-14 PROCEDURE — 85610 PROTHROMBIN TIME: CPT

## 2021-02-14 PROCEDURE — 85730 THROMBOPLASTIN TIME PARTIAL: CPT

## 2021-02-14 PROCEDURE — 70450 CT HEAD/BRAIN W/O DYE: CPT

## 2021-02-14 PROCEDURE — 84484 ASSAY OF TROPONIN QUANT: CPT

## 2021-02-14 PROCEDURE — 82140 ASSAY OF AMMONIA: CPT

## 2021-02-14 PROCEDURE — 80320 DRUG SCREEN QUANTALCOHOLS: CPT

## 2021-02-14 NOTE — XRAY REPORT
CHEST 1 VIEW 2/14/2021 2:39 PM



INDICATION / CLINICAL INFORMATION:

hypertension.



COMPARISON: 

None available.



FINDINGS:



SUPPORT DEVICES: None.

HEART / MEDIASTINUM: No significant abnormality. 

LUNGS / PLEURA: Clear lungs. No significant pleural effusion. No pneumothorax. 



ADDITIONAL FINDINGS: No significant additional findings.



IMPRESSION:

1. No significant abnormality of the chest.



Signer Name: Tam Liu MD 

Signed: 2/14/2021 3:50 PM

Workstation Name: [id number]

## 2021-02-14 NOTE — CAT SCAN REPORT
CT HEAD WITHOUT CONTRAST



INDICATION : 

AMS.



TECHNIQUE:  Axial, coronal and sagittal CT imaging was performed from the skull apex through the skul
l base without contrast.  All CT scans at this location are performed using CT dose reduction for ALA
RA by means of automated exposure control. 



COMPARISON:  CT head without contrast from 12/2/2017.



FINDINGS:  



PARENCHYMA:  No mass, midline shift, hemorrhage, extraaxial collection or acute territorial infarctio
n. 

VENTRICLES:  Symmetric and normal in size.  

SOFT TISSUES:  No significant abnormality of the included soft tissues/orbits.  

BONES:  No acute osseous abnormality.   

SINUSES: No significant abnormality. 

ADDITIONAL FINDINGS: None. 



IMPRESSION: 

1.  No acute intracranial abnormality. 



Signer Name: Tam Liu MD 

Signed: 2/14/2021 1:47 PM

Workstation Name: [id number]

## 2021-02-14 NOTE — EMERGENCY DEPARTMENT REPORT
ED General Adult HPI





- General


Chief complaint: Seizure


Stated complaint: SEIZURE


PUI?: No


Time Seen by Provider: 02/14/21 12:54


Source: EMS


Mode of arrival: Stretcher


Limitations: Other





- History of Present Illness


Initial comments: 


This is a 26-year-old male who has been previously admitted here for 

questionable overdose.  He also has been said to have a history of 

pseudoseizures.  I do not know if he has seizures as well.  Apparently, I am 

told by the nurse that the paramedics reported that he had a generalized seizure

at [place].  He was given 2 mg of Ativan IV.  He is now sedated and unable to

give a history.  Glucose prior to arrival I am told is normal.  No further 

information has available at this time.  The patient is altered and unable to 

provide a history.





Patient is noted to have a resting tachycardia of about 105-110.





-: unknown





- Related Data


                                Home Medications











 Medication  Instructions  Recorded  Confirmed  Last Taken


 


lamoTRIgine [LaMICtal] 100 mg PO QAM 01/30/17 10/20/20 Unknown


 


lamoTRIgine [LaMICtal] 300 mg PO QHS 01/30/17 10/20/20 Unknown


 


Emtricitabine/Tenofovir (Tdf) 1 each PO DAILY 10/20/20 10/20/20 Unknown





[Truvada 167 mg-250 mg Tablet]    


 


Lisinopril [Zestril] 5 mg PO DAILY 10/20/20 10/20/20 Unknown


 


Ondansetron HCl [Zofran] 4 mg PO PRN PRN 10/20/20 10/20/20 Unknown











                                    Allergies











Allergy/AdvReac Type Severity Reaction Status Date / Time


 


naproxen Allergy  Hives Verified 01/29/17 19:49














ED Review of Systems


ROS: 


Stated complaint: SEIZURE


Other details as noted in HPI





Comment: Unobtainable due to pts medical conditions





ED Past Medical Hx





- Past Medical History


Hx Hypertension: Yes


Hx Seizures: Yes


Hx Psychiatric Treatment: Yes (depression panic disorder/ANXIETY/ BIPOLAR)


Hx Asthma: Yes


Hx HIV: No





- Family History


Family history: other (1 no)





- Social History


Smoking Status: Smoker, Current Status Unknown


Substance Use Type: Other (Unknown)





- Medications


Home Medications: 


                                Home Medications











 Medication  Instructions  Recorded  Confirmed  Last Taken  Type


 


lamoTRIgine [LaMICtal] 100 mg PO QAM 01/30/17 10/20/20 Unknown History


 


lamoTRIgine [LaMICtal] 300 mg PO QHS 01/30/17 10/20/20 Unknown History


 


Emtricitabine/Tenofovir (Tdf) 1 each PO DAILY 10/20/20 10/20/20 Unknown History





[Truvada 167 mg-250 mg Tablet]     


 


Lisinopril [Zestril] 5 mg PO DAILY 10/20/20 10/20/20 Unknown History


 


Ondansetron HCl [Zofran] 4 mg PO PRN PRN 10/20/20 10/20/20 Unknown History














ED Physical Exam





- General


Limitations: Altered Mental Status, Physical Limitation


General appearance: alert, in no apparent distress, obese





- Head


Head exam: Present: atraumatic, normocephalic





- Eye


Eye exam: Present: PERRL.  Absent: scleral icterus


Pupils: Present: mydriatic





- ENT


ENT exam: Present: mucous membranes moist





- Neck


Neck exam: Present: normal inspection.  Absent: tenderness, meningismus





- Respiratory


Respiratory exam: Present: normal lung sounds bilaterally.  Absent: respiratory 

distress





- Cardiovascular


Cardiovascular Exam: Present: regular rate, normal rhythm.  Absent: systolic 

murmur, diastolic murmur, rubs, gallop





- GI/Abdominal


GI/Abdominal exam: Present: soft, normal bowel sounds.  Absent: distended, 

tenderness, guarding, rebound





- Rectal


Rectal exam: Present: deferred





- Extremities Exam


Extremities exam: Present: normal inspection





- Back Exam


Back exam: Present: normal inspection





- Neurological Exam


Neurological exam: Present: other (Little response to external stimuli)





- Psychiatric


Psychiatric exam: Present: other (Altered)





- Skin


Skin exam: Present: warm, dry, intact, normal color.  Absent: rash





ED Course


                                   Vital Signs











  02/14/21 02/14/21 02/14/21





  12:53 12:56 13:00


 


Temperature 98.9 F  


 


Pulse Rate 105 H  104 H


 


Respiratory 14 14 14





Rate   


 


Blood Pressure 148/93  


 


Blood Pressure   172/89





[Right]   


 


O2 Sat by Pulse 98 98 98





Oximetry   














  02/14/21 02/14/21





  14:06 15:07


 


Temperature  


 


Pulse Rate 99 H 89


 


Respiratory 14 16





Rate  


 


Blood Pressure  


 


Blood Pressure 134/82 134/85





[Right]  


 


O2 Sat by Pulse 98 99





Oximetry  














- Reevaluation(s)


Reevaluation #1: 


IV fluids recheck sugar.  CT the head observe and work-up.


02/14/21 14:24





Reevaluation #2: 


The fact that the patient has a normal lactic acid level right after a prolonged

 generalized seizure, as well as, a normal CK probably argues more for 

pseudoseizures.  On reexamination the patient is mental status has improved.  He

 is able to tell me he is not hungry.  We will observe him further.  It is 

anticipated that he may be able to return to [place].  His imaging tests were

 negative.  His labs were within acceptable limits.


02/14/21 14:50





Reevaluation #3: 


Patient with progressively improving mental status and no active complaints.  He

 will be observed until appropriate for return to the psychiatric setting.  





ED Medical Decision Making





- Lab Data


Result diagrams: 


                                 02/14/21 13:04





                                 02/14/21 13:04








                         Laboratory Results - last 24 hr











  02/14/21 02/14/21 02/14/21





  13:02 13:04 13:04


 


WBC   


 


RBC   


 


Hgb   


 


Hct   


 


MCV   


 


MCH   


 


MCHC   


 


RDW   


 


Plt Count   


 


Lymph % (Auto)   


 


Mono % (Auto)   


 


Eos % (Auto)   


 


Baso % (Auto)   


 


Lymph # (Auto)   


 


Mono # (Auto)   


 


Eos # (Auto)   


 


Baso # (Auto)   


 


Seg Neutrophils %   


 


Seg Neutrophils #   


 


PT   


 


INR   


 


APTT   


 


Sodium   


 


Potassium   


 


Chloride   


 


Carbon Dioxide   


 


Anion Gap   


 


BUN   


 


Creatinine   


 


Estimated GFR   


 


BUN/Creatinine Ratio   


 


Glucose   


 


POC Glucose  79  


 


Lactic Acid   


 


Calcium   


 


Magnesium   


 


Total Bilirubin   


 


Direct Bilirubin   


 


Indirect Bilirubin   


 


AST   


 


ALT   


 


Alkaline Phosphatase   


 


Ammonia   


 


Total Creatine Kinase   


 


CK-MB (CK-2)   


 


CK-MB (CK-2) Rel Index   


 


Troponin T   


 


NT-Pro-B Natriuret Pep   


 


Total Protein   


 


Albumin   


 


Albumin/Globulin Ratio   


 


Salicylates   < 0.3 L 


 


Acetaminophen    5.0 L


 


Plasma/Serum Alcohol   














  02/14/21 02/14/21 02/14/21





  13:04 13:04 13:04


 


WBC   


 


RBC   


 


Hgb   


 


Hct   


 


MCV   


 


MCH   


 


MCHC   


 


RDW   


 


Plt Count   


 


Lymph % (Auto)   


 


Mono % (Auto)   


 


Eos % (Auto)   


 


Baso % (Auto)   


 


Lymph # (Auto)   


 


Mono # (Auto)   


 


Eos # (Auto)   


 


Baso # (Auto)   


 


Seg Neutrophils %   


 


Seg Neutrophils #   


 


PT   


 


INR   


 


APTT   


 


Sodium  138  


 


Potassium  4.1  


 


Chloride  104.0  


 


Carbon Dioxide  28  


 


Anion Gap  10  


 


BUN  9  


 


Creatinine  1.0  


 


Estimated GFR  > 60  


 


BUN/Creatinine Ratio  9  


 


Glucose  101 H  


 


POC Glucose   


 


Lactic Acid   1.40 


 


Calcium  9.2  


 


Magnesium   


 


Total Bilirubin   


 


Direct Bilirubin   


 


Indirect Bilirubin   


 


AST   


 


ALT   


 


Alkaline Phosphatase   


 


Ammonia   


 


Total Creatine Kinase   


 


CK-MB (CK-2)   


 


CK-MB (CK-2) Rel Index   


 


Troponin T   


 


NT-Pro-B Natriuret Pep   


 


Total Protein   


 


Albumin   


 


Albumin/Globulin Ratio   


 


Salicylates   


 


Acetaminophen   


 


Plasma/Serum Alcohol    < 0.01














  02/14/21 02/14/21 02/14/21





  13:04 13:04 13:11


 


WBC  8.8  


 


RBC  4.49  


 


Hgb  14.0  


 


Hct  41.3  


 


MCV  92  


 


MCH  31  


 


MCHC  34  


 


RDW  12.3 L  


 


Plt Count  325  


 


Lymph % (Auto)  17.4  


 


Mono % (Auto)  6.4  


 


Eos % (Auto)  0.8  


 


Baso % (Auto)  0.8  


 


Lymph # (Auto)  1.5  


 


Mono # (Auto)  0.6  


 


Eos # (Auto)  0.1  


 


Baso # (Auto)  0.1  


 


Seg Neutrophils %  74.6 H  


 


Seg Neutrophils #  6.5  


 


PT    12.7


 


INR    0.97


 


APTT    26.1


 


Sodium   


 


Potassium   


 


Chloride   


 


Carbon Dioxide   


 


Anion Gap   


 


BUN   


 


Creatinine   


 


Estimated GFR   


 


BUN/Creatinine Ratio   


 


Glucose   


 


POC Glucose   


 


Lactic Acid   


 


Calcium   


 


Magnesium   


 


Total Bilirubin   


 


Direct Bilirubin   


 


Indirect Bilirubin   


 


AST   


 


ALT   


 


Alkaline Phosphatase   


 


Ammonia   


 


Total Creatine Kinase   152 


 


CK-MB (CK-2)   1.1 


 


CK-MB (CK-2) Rel Index   0.7 


 


Troponin T   


 


NT-Pro-B Natriuret Pep   


 


Total Protein   


 


Albumin   


 


Albumin/Globulin Ratio   


 


Salicylates   


 


Acetaminophen   


 


Plasma/Serum Alcohol   














  02/14/21 02/14/21





  13:11 13:11


 


WBC  


 


RBC  


 


Hgb  


 


Hct  


 


MCV  


 


MCH  


 


MCHC  


 


RDW  


 


Plt Count  


 


Lymph % (Auto)  


 


Mono % (Auto)  


 


Eos % (Auto)  


 


Baso % (Auto)  


 


Lymph # (Auto)  


 


Mono # (Auto)  


 


Eos # (Auto)  


 


Baso # (Auto)  


 


Seg Neutrophils %  


 


Seg Neutrophils #  


 


PT  


 


INR  


 


APTT  


 


Sodium  


 


Potassium  


 


Chloride  


 


Carbon Dioxide  


 


Anion Gap  


 


BUN  


 


Creatinine  


 


Estimated GFR  


 


BUN/Creatinine Ratio  


 


Glucose  


 


POC Glucose  


 


Lactic Acid  


 


Calcium  


 


Magnesium  2.10 


 


Total Bilirubin  0.20 


 


Direct Bilirubin  < 0.2 


 


Indirect Bilirubin  0.0 


 


AST  20 


 


ALT  46 


 


Alkaline Phosphatase  73 


 


Ammonia   24.0 L


 


Total Creatine Kinase  


 


CK-MB (CK-2)  


 


CK-MB (CK-2) Rel Index  


 


Troponin T  < 0.010 


 


NT-Pro-B Natriuret Pep  52.64 


 


Total Protein  6.9 


 


Albumin  4.6 


 


Albumin/Globulin Ratio  2.0 


 


Salicylates  


 


Acetaminophen  


 


Plasma/Serum Alcohol  














- EKG Data


-: EKG Interpreted by Me


EKG shows normal: sinus rhythm, axis, intervals, QRS complexes, ST-T waves


Rate: tachycardia





- EKG Data


Interpretation: no acute changes





- Radiology Data


Radiology results: report reviewed (Chest x-ray no acute process, CT the head 

read by radiologist as negative), image reviewed


Critical care attestation.: 


If time is entered above; I have spent that time in minutes in the direct care 

of this critically ill patient, excluding procedure time.








ED Disposition


Clinical Impression: 


 Seizure, Pseudoseizure, Psychiatric disorder





Disposition: DC-01 TO HOME OR SELFCARE


Is pt being admited?: No


Does the pt Need Aspirin: No


Condition: Stable


Instructions:  Schizophrenia


Additional Instructions: 


You are medically cleared to continue your treatment at [facility].


Referrals: 


PRIMARY CARE,MD [Primary Care Provider] - 3-5 Days


Time of Disposition: 15:38

## 2022-03-20 ENCOUNTER — HOSPITAL ENCOUNTER (EMERGENCY)
Dept: HOSPITAL 5 - ED | Age: 27
LOS: 1 days | Discharge: PSYCHIATRIC OR SUBSTANCE USE FACILITY | End: 2022-03-21
Payer: COMMERCIAL
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
## 2022-03-20 DIAGNOSIS — Z20.822: ICD-10-CM

## 2022-03-20 DIAGNOSIS — T65.92XA: Primary | ICD-10-CM

## 2022-03-20 DIAGNOSIS — R45.851: ICD-10-CM

## 2022-03-20 LAB
ALBUMIN SERPL-MCNC: 4.3 G/DL (ref 3.9–5)
ALT SERPL-CCNC: 55 UNITS/L (ref 7–56)
BACTERIA #/AREA URNS HPF: (no result) /HPF
BASOPHILS # (AUTO): 0 K/MM3 (ref 0–0.1)
BASOPHILS NFR BLD AUTO: 0.2 % (ref 0–1.8)
BENZODIAZEPINES SCREEN,URINE: (no result)
BILIRUB DIRECT SERPL-MCNC: < 0.2 MG/DL (ref 0–0.2)
BILIRUB UR QL STRIP: (no result)
BLOOD UR QL VISUAL: (no result)
BUN SERPL-MCNC: 8 MG/DL (ref 9–20)
BUN/CREAT SERPL: 7 %
CALCIUM SERPL-MCNC: 9 MG/DL (ref 8.4–10.2)
EOSINOPHIL # BLD AUTO: 0.1 K/MM3 (ref 0–0.4)
EOSINOPHIL NFR BLD AUTO: 1.2 % (ref 0–4.3)
GRAN CASTS #/AREA URNS LPF: 3 /LPF
HCT VFR BLD CALC: 41.5 % (ref 35.5–45.6)
HEMOLYSIS INDEX: 4
HGB BLD-MCNC: 13.4 GM/DL (ref 11.8–15.2)
HYALINE CASTS #/AREA URNS LPF: 5 /LPF
LYMPHOCYTES # BLD AUTO: 1 K/MM3 (ref 1.2–5.4)
LYMPHOCYTES NFR BLD AUTO: 14.3 % (ref 13.4–35)
MCHC RBC AUTO-ENTMCNC: 32 % (ref 32–34)
MCV RBC AUTO: 88 FL (ref 84–94)
METHADONE SCREEN,URINE: (no result)
MONOCYTES # (AUTO): 0.5 K/MM3 (ref 0–0.8)
MONOCYTES % (AUTO): 7.3 % (ref 0–7.3)
MUCOUS THREADS #/AREA URNS HPF: (no result) /HPF
OPIATE SCREEN,URINE: (no result)
PH UR STRIP: 5 [PH] (ref 5–7)
PLATELET # BLD: 319 K/MM3 (ref 140–440)
RBC # BLD AUTO: 4.72 M/MM3 (ref 3.65–5.03)
RBC #/AREA URNS HPF: 3 /HPF (ref 0–6)
UROBILINOGEN UR-MCNC: < 2 MG/DL (ref ?–2)
WBC #/AREA URNS HPF: 36 /HPF (ref 0–6)

## 2022-03-20 PROCEDURE — 85025 COMPLETE CBC W/AUTO DIFF WBC: CPT

## 2022-03-20 PROCEDURE — 80307 DRUG TEST PRSMV CHEM ANLYZR: CPT

## 2022-03-20 PROCEDURE — 93005 ELECTROCARDIOGRAM TRACING: CPT

## 2022-03-20 PROCEDURE — 82550 ASSAY OF CK (CPK): CPT

## 2022-03-20 PROCEDURE — 96374 THER/PROPH/DIAG INJ IV PUSH: CPT

## 2022-03-20 PROCEDURE — 81001 URINALYSIS AUTO W/SCOPE: CPT

## 2022-03-20 PROCEDURE — 87086 URINE CULTURE/COLONY COUNT: CPT

## 2022-03-20 PROCEDURE — 36415 COLL VENOUS BLD VENIPUNCTURE: CPT

## 2022-03-20 PROCEDURE — 99285 EMERGENCY DEPT VISIT HI MDM: CPT

## 2022-03-20 PROCEDURE — 96372 THER/PROPH/DIAG INJ SC/IM: CPT

## 2022-03-20 PROCEDURE — 80048 BASIC METABOLIC PNL TOTAL CA: CPT

## 2022-03-20 PROCEDURE — 80320 DRUG SCREEN QUANTALCOHOLS: CPT

## 2022-03-20 PROCEDURE — U0003 INFECTIOUS AGENT DETECTION BY NUCLEIC ACID (DNA OR RNA); SEVERE ACUTE RESPIRATORY SYNDROME CORONAVIRUS 2 (SARS-COV-2) (CORONAVIRUS DISEASE [COVID-19]), AMPLIFIED PROBE TECHNIQUE, MAKING USE OF HIGH THROUGHPUT TECHNOLOGIES AS DESCRIBED BY CMS-2020-01-R: HCPCS

## 2022-03-20 PROCEDURE — 82140 ASSAY OF AMMONIA: CPT

## 2022-03-20 PROCEDURE — G0480 DRUG TEST DEF 1-7 CLASSES: HCPCS

## 2022-03-20 PROCEDURE — 80076 HEPATIC FUNCTION PANEL: CPT

## 2022-03-20 PROCEDURE — 96361 HYDRATE IV INFUSION ADD-ON: CPT

## 2022-03-20 PROCEDURE — 84132 ASSAY OF SERUM POTASSIUM: CPT

## 2022-03-20 RX ADMIN — POTASSIUM CHLORIDE SCH MLS/HR: 10 INJECTION, SOLUTION INTRAVENOUS at 16:07

## 2022-03-20 RX ADMIN — POTASSIUM CHLORIDE SCH MLS/HR: 10 INJECTION, SOLUTION INTRAVENOUS at 11:44

## 2022-03-20 NOTE — EMERGENCY DEPARTMENT REPORT
ED Psych HPI





- General


Chief Complaint: Psych


Stated Complaint: SUICIDE ATTEMPT


Time Seen by Provider: 03/20/22 09:25


Source: EMS


Mode of arrival: Stretcher





- History of Present Illness


Initial Comments: 





Patient is 27 years old male with history of bipolar disorder, pseudoseizure and

questionable suicidal attempt before.  Patient brought to the emergency room via

EMS from home.  EMS reported that patient came down to his parents and told him 

that he took all his medication to kill himself.  His medication include 

Lamictal, lisinopril, trazodone and Ariprozole. EMS reported that patient found 

sitting on his couch in no acute distress.  EMS reported that as soon as they 

took him to the ambulance he started acting up and pretending like he is having 

a seizure.  Upon arrival to the ER, patient continue to act like he is having a 

seizure however he will stop when we called his name.  Patient is refusing to 

talk in he will just stare at you.  Patient will become agitated.  Patient given

Ativan 2 mg IV.  Poison control consulted.


MD Complaint: altered mental status, other


-: This morning


Associated Psychiatric Symptoms: suicidal ideation





- Related Data


                                Home Medications











 Medication  Instructions  Recorded  Confirmed  Last Taken


 


lamoTRIgine [LaMICtal] 100 mg PO QAM 01/30/17 03/20/22 Unknown


 


lamoTRIgine [LaMICtal] 300 mg PO QHS 01/30/17 03/20/22 Unknown


 


Lisinopril [Zestril] 5 mg PO DAILY 10/20/20 03/20/22 Unknown


 


ARIPiprazole [Aripiprazole] 10 mg PO BID 03/20/22 03/20/22 Unknown


 


traZODone [Desyrel] 100 mg PO QHS 03/20/22 03/20/22 Unknown











                                    Allergies











Allergy/AdvReac Type Severity Reaction Status Date / Time


 


naproxen Allergy  Hives Verified 03/20/22 09:25














ED Review of Systems


ROS: 


Stated complaint: SUICIDE ATTEMPT


Other details as noted in HPI





Comment: Unobtainable due to pts medical conditions





ED Past Medical Hx





- Past Medical History


Hx Hypertension: Yes


Hx Seizures: Yes


Hx Psychiatric Treatment: Yes (depression panic disorder/ANXIETY/ BIPOLAR)


Hx Asthma: Yes


Hx HIV: No





- Social History


Smoking Status: Never Smoker





- Medications


Home Medications: 


                                Home Medications











 Medication  Instructions  Recorded  Confirmed  Last Taken  Type


 


lamoTRIgine [LaMICtal] 100 mg PO QAM 01/30/17 03/20/22 Unknown History


 


lamoTRIgine [LaMICtal] 300 mg PO QHS 01/30/17 03/20/22 Unknown History


 


Lisinopril [Zestril] 5 mg PO DAILY 10/20/20 03/20/22 Unknown History


 


ARIPiprazole [Aripiprazole] 10 mg PO BID 03/20/22 03/20/22 Unknown History


 


traZODone [Desyrel] 100 mg PO QHS 03/20/22 03/20/22 Unknown History














ED Physical Exam





- General


Limitations: Altered Mental Status


General appearance: alert, in no apparent distress, anxious, other (Agitated.)





- Head


Head exam: Present: atraumatic, normocephalic, normal inspection





- Eye


Eye exam: Present: normal appearance





- ENT


ENT exam: Present: normal exam, normal orophraynx, mucous membranes moist





- Neck


Neck exam: Present: normal inspection, full ROM.  Absent: tenderness, 

meningismus





- Respiratory


Respiratory exam: Present: normal lung sounds bilaterally





- Cardiovascular


Cardiovascular Exam: Present: regular rate, normal rhythm, normal heart sounds





- GI/Abdominal


GI/Abdominal exam: Present: soft, normal bowel sounds.  Absent: distended, 

tenderness, guarding, rebound, rigid, organomegaly, mass, bruit, pulsatile mass,

 hernia





- Extremities Exam


Extremities exam: Present: normal inspection, full ROM, normal capillary refill.

  Absent: tenderness





- Back Exam


Back exam: Present: normal inspection, full ROM.  Absent: CVA tenderness (R), 

CVA tenderness (L)





- Neurological Exam


Neurological exam: Present: alert, CN II-XII intact, reflexes normal.  Absent: 

motor sensory deficit





- Psychiatric


Psychiatric exam: Present: agitated, anxious, manic, suicidal ideation.  Absent:

 homicidal ideation





- Skin


Skin exam: Present: warm, intact, normal color





ED Course


                                   Vital Signs











  03/20/22 03/20/22 03/20/22





  09:22 09:30 09:46


 


Temperature   


 


Pulse Rate 131 H 119 H 108 H


 


Respiratory 29 H 23 20





Rate   


 


Blood Pressure  119/68 93/49


 


O2 Sat by Pulse 97 94 93





Oximetry   














  03/20/22 03/20/22 03/20/22





  10:00 10:16 10:18


 


Temperature   


 


Pulse Rate  103 H 


 


Respiratory 17 12 17





Rate   


 


Blood Pressure 69/23 110/61 


 


O2 Sat by Pulse 93 98 99





Oximetry   














  03/20/22





  10:21


 


Temperature 98.9 F


 


Pulse Rate 83


 


Respiratory 





Rate 


 


Blood Pressure 110/61


 


O2 Sat by Pulse 





Oximetry 














ED Medical Decision Making





- Lab Data


Result diagrams: 


                                 03/20/22 09:38





                                 03/20/22 09:38





- EKG Data


-: EKG Interpreted by Me


EKG shows normal: sinus rhythm


Rate: tachycardia





- EKG Data


Interpretation: no acute changes





- Medical Decision Making





Patient is 27 years old male with history of bipolar disorder, pseudoseizure and

 questionable suicidal attempt before.  Patient brought to the emergency room 

via EMS from home.  EMS reported that patient came down to his parents and told 

him that he took all his medication to kill himself.  His medication include 

Lamictal, lisinopril, trazodone and Ariprozole. EMS reported that patient found 

sitting on his couch in no acute distress.  EMS reported that as soon as they 

took him to the ambulance he started acting up and pretending like he is having 

a seizure.  Upon arrival to the ER, patient continue to act like he is having a 

seizure however he will stop when we called his name.  Patient is refusing to 

talk in he will just stare at you.  Patient will become agitated.  Patient given

 Ativan 2 mg IV.  Poison control consulted.





I was able to talk to patient mother she stated that he lives upstairs and and 

her father downstairs.  She stated that he call them twice but they did not hear

 the phone ringing however they  the phone on the third time and he told 

him that he took all his medication.  Mother stated that this is happened around

 5 AM.  She also added that patient has been acting weird recently.  She stated 

that he is hearing voices asking him to kill himself.  She stated that he tried 

to commit suicide 5 times since last year.  She stated that he received his 

monthly antipsychotic shot last week.








Patient remained stable in the ER with stable vital sign.  Ativan helped a lot. 

 Patient now is relaxed and able to communicate he is asking for food now.  He 

stated that nobody is listening to him.  EKG showed sinus tachycardia.  Labs 

reviewed and showed a potassium of 2.8.  Patient received potassium 20 mEq IV 

and also supplemented with 40 mEq K-Dur.





Critical care attestation.: 


If time is entered above; I have spent that time in minutes in the direct care 

of this critically ill patient, excluding procedure time.








ED Disposition


Clinical Impression: 


 Drug overdose, intentional, Suicide attempt





Is pt being admited?: No


Condition: Stable


Referrals: 


PRIMARY CARE,MD [Primary Care Provider] - 3-5 Days

## 2022-03-21 VITALS — SYSTOLIC BLOOD PRESSURE: 126 MMHG | DIASTOLIC BLOOD PRESSURE: 78 MMHG

## 2022-03-21 NOTE — CONSULTATION
History of Present Illness





- Reason for Consult


Consult date: 03/21/22


Reason for consult: OD





- History of Present Psychiatric Illness


ED Note: Patient is 27 years old male with history of bipolar disorder, 

pseudoseizure and questionable suicidal attempt before.  Patient brought to the 

emergency room via EMS from home.  EMS reported that patient came down to his 

parents and told him that he took all his medication to kill himself.  His 

medication include Lamictal, lisinopril, trazodone and Ariprozole. EMS reported 

that patient found sitting on his couch in no acute distress.  EMS reported that

as soon as they took him to the ambulance he started acting up and pretending 

like he is having a seizure.  Upon arrival to the ER, patient continue to act 

like he is having a seizure however he will stop when we called his name.  

Patient is refusing to talk in he will just stare at you.  Patient will become 

agitated.  Patient given Ativan 2 mg IV.  Poison control consulted.





The patient was seen this morning. The patient is shivering and staring into 

space. He reports having suicidal ideation without a plan. The patient is unable

to state recent stressor. He denies any current auditory/visual hallucinations. 





Diagnoses: Bipolar


Suicide attempts or Self-harm behavior: Yes


Prior psychiatric hospitalizations: Yes


Substance Abuse history: Denies


Previous psychiatric medications tried:Unable to recall


Outpatient treatment: Unknown





PAST MEDICAL HISTORY: unknown





Family Psychiatric History: None reported or documented





SOCIAL HISTORY


Marital Status: Single


Living Arrangements: lives with family


Employment Status: unemployed


Access to guns/weapons: Denies 


Education: Some college


History of Abuse: none reported


Legal History: none reported





REVIEW OF SYSTEMS


Constitutional: Negative for weight loss


ENT: Negative for stridor


Respiratory: Negative for cough or hemoptysis


All other systems reviewed and are negative


 


MENTAL STATUS EXAMINATION


General Appearance and Behavior: Age appropriate, good hygiene, wearing 

appropriate clothes, calm, cooperative


Cooperation: Participating/engaged


Psychomotor Behavior: Normal


Mood: Depressed


Affect and affective range: congruent with mood


Thought Process: circumstantial


Thought Content: Suicidal


Speech: Low volume


Suicidal Ideation: Yes


Homicidal Ideation: Denies 


Hallucinations: Denies


Delusions: None elicited


Impulse Control: Normal


Insight and Judgment: Limited insight and judgment,


Memory: Normal 


Attention: Divided


Orientation: Alert, oriented





 Assessment and Plan 


(1)Bipolar


Current Visit: Yes   Status: Acute


1013


Treatment Plan





Start Seroquel 25mg po BID


Start Seroquel 50mg po QHS








The patient needs to follow up with his outpatient psychiatrist and therapist. 


Continue home meds


Disposition:   Recommend psychiatric inpatient admission at this time. 


Will follow. Thanks


Case staffed with Dr. Bajwa





Medications and Allergies


                                    Allergies











Allergy/AdvReac Type Severity Reaction Status Date / Time


 


naproxen Allergy  Hives Verified 03/20/22 09:25











                                Home Medications











 Medication  Instructions  Recorded  Confirmed  Last Taken  Type


 


lamoTRIgine [LaMICtal] 100 mg PO QAM 01/30/17 03/20/22 Unknown History


 


lamoTRIgine [LaMICtal] 300 mg PO QHS 01/30/17 03/20/22 Unknown History


 


Lisinopril [Zestril] 5 mg PO DAILY 10/20/20 03/20/22 Unknown History


 


ARIPiprazole [Aripiprazole] 10 mg PO BID 03/20/22 03/20/22 Unknown History


 


traZODone [Desyrel] 100 mg PO QHS 03/20/22 03/20/22 Unknown History











Active Meds: 


Active Medications





Aripiprazole (Aripiprazole 10 Mg Tab)  10 mg PO BID [facility]


   Last Admin: 03/20/22 23:45 Dose:  10 mg


   


Lamotrigine (Lamotrigine 100 Mg Tab)  300 mg PO QHS [facility]


   Last Admin: 03/20/22 22:00 Dose:  300 mg


   


Lamotrigine (Lamotrigine 100 Mg Tab)  100 mg PO DAILY [facility]


Lisinopril (Lisinopril 10 Mg Tab)  10 mg PO DAILY [facility]


Trazodone HCl (Trazodone 100 Mg Tab)  100 mg PO QHS [facility]


   Last Admin: 03/20/22 22:00 Dose:  100 mg


   











Mental Status Exam





- Vital signs


                                Last Vital Signs











Temp  98 F   03/21/22 02:30


 


Pulse  82   03/21/22 02:30


 


Resp  18   03/21/22 02:30


 


BP  126/78   03/21/22 02:30


 


Pulse Ox  100   03/21/22 02:30














Results


Result Diagrams: 


                                 03/20/22 09:38





                                 03/20/22 14:13


                              Abnormal lab results











  03/20/22 03/20/22 03/20/22 Range/Units





  09:38 09:38 09:38 


 


Lymph # (Auto)  1.0 L    (1.2-5.4)  K/mm3


 


Seg Neutrophils %  77.0 H    (40.0-70.0)  %


 


Potassium   2.8 L*   (3.6-5.0)  mmol/L


 


Carbon Dioxide   20 L   (22-30)  mmol/L


 


BUN   8 L   (9-20)  mg/dL


 


Glucose   172 H   ()  mg/dL


 


Lactic Acid     (0.7-2.0)  mmol/L


 


Urine WBC (Auto)     (0.0-6.0)  /HPF


 


Salicylates    < 0.3 L  (2.8-20.0)  mg/dL


 


Acetaminophen     (10.0-30.0)  ug/mL














  03/20/22 03/20/22 03/20/22 Range/Units





  09:38 09:38 11:45 


 


Lymph # (Auto)     (1.2-5.4)  K/mm3


 


Seg Neutrophils %     (40.0-70.0)  %


 


Potassium     (3.6-5.0)  mmol/L


 


Carbon Dioxide     (22-30)  mmol/L


 


BUN     (9-20)  mg/dL


 


Glucose     ()  mg/dL


 


Lactic Acid   4.20 H*   (0.7-2.0)  mmol/L


 


Urine WBC (Auto)     (0.0-6.0)  /HPF


 


Salicylates     (2.8-20.0)  mg/dL


 


Acetaminophen  5.0 L   5.0 L  (10.0-30.0)  ug/mL














  03/20/22 Range/Units





  18:40 


 


Lymph # (Auto)   (1.2-5.4)  K/mm3


 


Seg Neutrophils %   (40.0-70.0)  %


 


Potassium   (3.6-5.0)  mmol/L


 


Carbon Dioxide   (22-30)  mmol/L


 


BUN   (9-20)  mg/dL


 


Glucose   ()  mg/dL


 


Lactic Acid   (0.7-2.0)  mmol/L


 


Urine WBC (Auto)  36.0 H  (0.0-6.0)  /HPF


 


Salicylates   (2.8-20.0)  mg/dL


 


Acetaminophen   (10.0-30.0)  ug/mL








All other labs normal.

## 2022-03-21 NOTE — EVENT NOTE
Date: 03/21/22





vss , no distress no events overnight 


medically cleared hypokalemia corrected 


psych assessed 


recommends inpatient 


awaiting transfer

## 2022-03-21 NOTE — ELECTROCARDIOGRAPH REPORT
[facility]

                                       

Test Date:    2022               Test Time:    09:19:02

Pat Name:     ADRIANE BENITO             Department:   

Patient ID:   [id number]          Room:          

Gender:       M                        Technician:   TERI

:          1995               Requested By: SARI HENNING

Order Number: [id number]              Reading MD:   Luciano Vila

                                 Measurements

Intervals                              Axis          

Rate:         135                      P:            36

NV:           136                      QRS:          38

QRSD:         90                       T:            55

QT:           301                                    

QTc:          452                                    

                           Interpretive Statements

Sinus tachycardia

Probable left atrial enlargement

No previous ECG available for comparison

Electronically Signed On 3- 9:12:30 EDT by Luciano Vila